# Patient Record
Sex: MALE | Race: WHITE | URBAN - NONMETROPOLITAN AREA
[De-identification: names, ages, dates, MRNs, and addresses within clinical notes are randomized per-mention and may not be internally consistent; named-entity substitution may affect disease eponyms.]

---

## 2018-02-08 ENCOUNTER — OFFICE VISIT (OUTPATIENT)
Dept: FAMILY MEDICINE CLINIC | Age: 38
End: 2018-02-08

## 2018-02-08 VITALS
HEART RATE: 74 BPM | RESPIRATION RATE: 16 BRPM | HEIGHT: 63 IN | OXYGEN SATURATION: 98 % | WEIGHT: 155 LBS | BODY MASS INDEX: 27.46 KG/M2 | DIASTOLIC BLOOD PRESSURE: 78 MMHG | SYSTOLIC BLOOD PRESSURE: 110 MMHG

## 2018-02-08 DIAGNOSIS — G89.29 CHRONIC MIDLINE LOW BACK PAIN WITHOUT SCIATICA: ICD-10-CM

## 2018-02-08 DIAGNOSIS — R25.2 SPASM: ICD-10-CM

## 2018-02-08 DIAGNOSIS — Z00.00 ROUTINE HEALTH MAINTENANCE: ICD-10-CM

## 2018-02-08 DIAGNOSIS — M54.50 CHRONIC MIDLINE LOW BACK PAIN WITHOUT SCIATICA: ICD-10-CM

## 2018-02-08 DIAGNOSIS — K21.9 GASTROESOPHAGEAL REFLUX DISEASE WITHOUT ESOPHAGITIS: ICD-10-CM

## 2018-02-08 DIAGNOSIS — R19.7 DIARRHEA, UNSPECIFIED TYPE: Primary | ICD-10-CM

## 2018-02-08 PROCEDURE — 99203 OFFICE O/P NEW LOW 30 MIN: CPT | Performed by: NURSE PRACTITIONER

## 2018-02-08 RX ORDER — DICYCLOMINE HYDROCHLORIDE 10 MG/1
10 CAPSULE ORAL
Qty: 120 CAPSULE | Refills: 1 | Status: SHIPPED | OUTPATIENT
Start: 2018-02-08

## 2018-02-08 RX ORDER — RANITIDINE 150 MG/1
150 TABLET ORAL NIGHTLY
Qty: 30 TABLET | Refills: 3 | Status: SHIPPED | OUTPATIENT
Start: 2018-02-08

## 2018-02-08 RX ORDER — TIZANIDINE 4 MG/1
4 TABLET ORAL EVERY 8 HOURS PRN
Qty: 30 TABLET | Refills: 0 | Status: SHIPPED | OUTPATIENT
Start: 2018-02-08

## 2018-02-08 ASSESSMENT — PATIENT HEALTH QUESTIONNAIRE - PHQ9
SUM OF ALL RESPONSES TO PHQ QUESTIONS 1-9: 0
2. FEELING DOWN, DEPRESSED OR HOPELESS: 0
SUM OF ALL RESPONSES TO PHQ9 QUESTIONS 1 & 2: 0
1. LITTLE INTEREST OR PLEASURE IN DOING THINGS: 0

## 2018-02-08 ASSESSMENT — ENCOUNTER SYMPTOMS
BLOOD IN STOOL: 0
COUGH: 0
CHEST TIGHTNESS: 0
BACK PAIN: 1
NAUSEA: 0
ABDOMINAL PAIN: 0
DIARRHEA: 1
SHORTNESS OF BREATH: 0
WHEEZING: 0
VOMITING: 0

## 2018-02-08 NOTE — PROGRESS NOTES
Smoker    Smokeless Tobacco Use  Current User Smokeless Tobacco Type  Snuff          Alcohol History     Alcohol Use Status  No          Drug Use     Drug Use Status  No          Sexual Activity     Sexually Active  Yes Partners  Female                Problem List Items Addressed This Visit     Diarrhea - Primary    Relevant Medications    dicyclomine (BENTYL) 10 MG capsule    Other Relevant Orders    [facility]- Daniel Machado MD    Chronic midline low back pain without sciatica    Relevant Medications    tiZANidine (ZANAFLEX) 4 MG tablet      Other Visit Diagnoses     Routine health maintenance        Relevant Orders    CBC    Comprehensive Metabolic Panel    HIV Screen    Lipid Panel    TSH with Reflex    Gastroesophageal reflux disease without esophagitis        Relevant Medications    dicyclomine (BENTYL) 10 MG capsule    ranitidine (ZANTAC) 150 MG tablet    Spasm        Relevant Medications    tiZANidine (ZANAFLEX) 4 MG tablet          Review of Systems   Constitutional: Negative for appetite change, chills, fatigue and unexpected weight change. Respiratory: Negative for cough, chest tightness, shortness of breath and wheezing. Cardiovascular: Negative for chest pain, palpitations and leg swelling. Gastrointestinal: Positive for diarrhea. Negative for abdominal pain, blood in stool, nausea and vomiting. Musculoskeletal: Positive for back pain. Negative for arthralgias. Neurological: Negative for weakness, numbness and headaches. Hematological: Negative for adenopathy. OBJECTIVE:    /78 (Site: Left Arm, Position: Sitting, Cuff Size: Medium Adult)   Pulse 74   Resp 16   Ht 5' 3\" (1.6 m)   Wt 155 lb (70.3 kg)   SpO2 98%   BMI 27.46 kg/m²     Physical Exam   Constitutional: He is oriented to person, place, and time. He appears well-developed and well-nourished. HENT:   Head: Normocephalic.    Right Ear: Hearing, tympanic membrane, external ear and ear canal normal.   Left Ear: Hearing, tympanic membrane, external ear and ear canal normal.   Nose: Nose normal.   Mouth/Throat: Uvula is midline, oropharynx is clear and moist and mucous membranes are normal.   Eyes: Conjunctivae and EOM are normal. Pupils are equal, round, and reactive to light. Neck: Normal range of motion. Neck supple. Cardiovascular: Normal rate, regular rhythm and normal heart sounds. Exam reveals no gallop and no friction rub. No murmur heard. Pulmonary/Chest: Effort normal and breath sounds normal. No respiratory distress. He has no wheezes. He has no rhonchi. He has no rales. Abdominal: Soft. Bowel sounds are normal. He exhibits no distension. There is no tenderness. There is no rigidity and no guarding. Musculoskeletal: Normal range of motion. He exhibits no edema. Lumbar back: He exhibits normal range of motion, no tenderness, no bony tenderness, no swelling and no deformity. Negative SLR   Lymphadenopathy:     He has no cervical adenopathy. Right cervical: No superficial cervical and no posterior cervical adenopathy present. Left cervical: No superficial cervical and no posterior cervical adenopathy present. Neurological: He is alert and oriented to person, place, and time. No cranial nerve deficit. Skin: Skin is warm and dry. Psychiatric: He has a normal mood and affect. His behavior is normal.       ASSESSMENT/PLAN:    1. Diarrhea, unspecified type  Trial on bentyl  Referred to GI for further evaluation  - [facility]- Daniel Machado MD  - dicyclomine (BENTYL) 10 MG capsule; Take 1 capsule by mouth 4 times daily (before meals and nightly)  Dispense: 120 capsule; Refill: 1    2. Chronic midline low back pain without sciatica  zanaflex PRN spasm  Strongly encouraged yoga, regular exercise  - tiZANidine (ZANAFLEX) 4 MG tablet; Take 1 tablet by mouth every 8 hours as needed (spasm)  Dispense: 30 tablet; Refill: 0    3.  Routine health

## 2018-03-21 ENCOUNTER — OFFICE VISIT (OUTPATIENT)
Dept: INTERNAL MEDICINE CLINIC | Age: 38
End: 2018-03-21

## 2018-03-21 VITALS
HEIGHT: 63 IN | RESPIRATION RATE: 14 BRPM | SYSTOLIC BLOOD PRESSURE: 122 MMHG | WEIGHT: 154 LBS | BODY MASS INDEX: 27.29 KG/M2 | OXYGEN SATURATION: 98 % | HEART RATE: 75 BPM | DIASTOLIC BLOOD PRESSURE: 80 MMHG

## 2018-03-21 DIAGNOSIS — N45.1 EPIDIDYMITIS: ICD-10-CM

## 2018-03-21 DIAGNOSIS — M54.6 CHRONIC BILATERAL THORACIC BACK PAIN: Primary | ICD-10-CM

## 2018-03-21 DIAGNOSIS — J30.9 CHRONIC ALLERGIC RHINITIS, UNSPECIFIED SEASONALITY, UNSPECIFIED TRIGGER: ICD-10-CM

## 2018-03-21 DIAGNOSIS — Z00.00 HEALTH CARE MAINTENANCE: ICD-10-CM

## 2018-03-21 DIAGNOSIS — G89.29 CHRONIC MIDLINE LOW BACK PAIN WITHOUT SCIATICA: ICD-10-CM

## 2018-03-21 DIAGNOSIS — M54.50 CHRONIC MIDLINE LOW BACK PAIN WITHOUT SCIATICA: ICD-10-CM

## 2018-03-21 DIAGNOSIS — G89.29 CHRONIC BILATERAL THORACIC BACK PAIN: Primary | ICD-10-CM

## 2018-03-21 PROCEDURE — 99214 OFFICE O/P EST MOD 30 MIN: CPT | Performed by: NURSE PRACTITIONER

## 2018-03-21 RX ORDER — LORATADINE 10 MG/1
10 TABLET ORAL DAILY
Qty: 30 TABLET | Refills: 3 | Status: SHIPPED | OUTPATIENT
Start: 2018-03-21

## 2018-03-26 ENCOUNTER — NURSE ONLY (OUTPATIENT)
Dept: INTERNAL MEDICINE CLINIC | Age: 38
End: 2018-03-26

## 2018-03-26 DIAGNOSIS — Z00.00 ROUTINE HEALTH MAINTENANCE: ICD-10-CM

## 2018-03-26 LAB
A/G RATIO: 2.5 (ref 1.1–2.2)
ALBUMIN SERPL-MCNC: 5.2 G/DL (ref 3.4–5)
ALP BLD-CCNC: 64 U/L (ref 40–129)
ALT SERPL-CCNC: 36 U/L (ref 10–40)
AMPHETAMINE SCREEN, URINE: NORMAL
ANION GAP SERPL CALCULATED.3IONS-SCNC: 15 MMOL/L (ref 3–16)
AST SERPL-CCNC: 23 U/L (ref 15–37)
BARBITURATE SCREEN URINE: NORMAL
BASOPHILS ABSOLUTE: 0 K/UL (ref 0–0.2)
BASOPHILS RELATIVE PERCENT: 0.8 %
BENZODIAZEPINE SCREEN, URINE: NORMAL
BILIRUB SERPL-MCNC: 0.7 MG/DL (ref 0–1)
BUN BLDV-MCNC: 9 MG/DL (ref 7–20)
CALCIUM SERPL-MCNC: 9.4 MG/DL (ref 8.3–10.6)
CANNABINOID SCREEN URINE: NORMAL
CHLORIDE BLD-SCNC: 101 MMOL/L (ref 99–110)
CO2: 28 MMOL/L (ref 21–32)
COCAINE METABOLITE SCREEN URINE: NORMAL
CREAT SERPL-MCNC: 0.9 MG/DL (ref 0.9–1.3)
EOSINOPHILS ABSOLUTE: 0.1 K/UL (ref 0–0.6)
EOSINOPHILS RELATIVE PERCENT: 1.5 %
GFR AFRICAN AMERICAN: >60
GFR NON-AFRICAN AMERICAN: >60
GLOBULIN: 2.1 G/DL
GLUCOSE BLD-MCNC: 96 MG/DL (ref 70–99)
HCT VFR BLD CALC: 45.5 % (ref 40.5–52.5)
HEMOGLOBIN: 15.7 G/DL (ref 13.5–17.5)
LYMPHOCYTES ABSOLUTE: 1.3 K/UL (ref 1–5.1)
LYMPHOCYTES RELATIVE PERCENT: 22.6 %
Lab: NORMAL
MCH RBC QN AUTO: 30.1 PG (ref 26–34)
MCHC RBC AUTO-ENTMCNC: 34.5 G/DL (ref 31–36)
MCV RBC AUTO: 87.3 FL (ref 80–100)
METHADONE SCREEN, URINE: NORMAL
MONOCYTES ABSOLUTE: 0.6 K/UL (ref 0–1.3)
MONOCYTES RELATIVE PERCENT: 9.5 %
NEUTROPHILS ABSOLUTE: 3.8 K/UL (ref 1.7–7.7)
NEUTROPHILS RELATIVE PERCENT: 65.6 %
OPIATE SCREEN URINE: NORMAL
OXYCODONE URINE: NORMAL
PDW BLD-RTO: 12.7 % (ref 12.4–15.4)
PH UA: 5
PHENCYCLIDINE SCREEN URINE: NORMAL
PLATELET # BLD: 247 K/UL (ref 135–450)
PMV BLD AUTO: 8.8 FL (ref 5–10.5)
POTASSIUM SERPL-SCNC: 4 MMOL/L (ref 3.5–5.1)
PROPOXYPHENE SCREEN: NORMAL
RBC # BLD: 5.21 M/UL (ref 4.2–5.9)
SODIUM BLD-SCNC: 144 MMOL/L (ref 136–145)
T4 FREE: 1.3 NG/DL (ref 0.9–1.8)
TOTAL PROTEIN: 7.3 G/DL (ref 6.4–8.2)
TSH SERPL DL<=0.05 MIU/L-ACNC: 1.82 UIU/ML (ref 0.27–4.2)
WBC # BLD: 5.8 K/UL (ref 4–11)

## 2018-03-29 ENCOUNTER — TELEPHONE (OUTPATIENT)
Dept: INTERNAL MEDICINE CLINIC | Age: 38
End: 2018-03-29

## 2018-04-05 ENCOUNTER — TELEPHONE (OUTPATIENT)
Dept: INTERNAL MEDICINE CLINIC | Age: 38
End: 2018-04-05

## 2018-04-12 ENCOUNTER — TELEPHONE (OUTPATIENT)
Dept: INTERNAL MEDICINE CLINIC | Age: 38
End: 2018-04-12

## 2018-04-20 ENCOUNTER — OFFICE VISIT (OUTPATIENT)
Dept: INTERNAL MEDICINE CLINIC | Age: 38
End: 2018-04-20

## 2018-04-20 VITALS
WEIGHT: 150 LBS | HEART RATE: 94 BPM | SYSTOLIC BLOOD PRESSURE: 115 MMHG | RESPIRATION RATE: 16 BRPM | BODY MASS INDEX: 26.58 KG/M2 | DIASTOLIC BLOOD PRESSURE: 80 MMHG | OXYGEN SATURATION: 98 % | TEMPERATURE: 98.5 F | HEIGHT: 63 IN

## 2018-04-20 DIAGNOSIS — R10.13 EPIGASTRIC PAIN: ICD-10-CM

## 2018-04-20 DIAGNOSIS — G89.29 CHRONIC BILATERAL THORACIC BACK PAIN: ICD-10-CM

## 2018-04-20 DIAGNOSIS — M54.50 CHRONIC MIDLINE LOW BACK PAIN WITHOUT SCIATICA: ICD-10-CM

## 2018-04-20 DIAGNOSIS — R19.8 ABNORMAL FINDINGS ON ESOPHAGOGASTRODUODENOSCOPY (EGD): Primary | ICD-10-CM

## 2018-04-20 DIAGNOSIS — J30.9 CHRONIC ALLERGIC RHINITIS, UNSPECIFIED SEASONALITY, UNSPECIFIED TRIGGER: ICD-10-CM

## 2018-04-20 DIAGNOSIS — G89.29 CHRONIC MIDLINE LOW BACK PAIN WITHOUT SCIATICA: ICD-10-CM

## 2018-04-20 DIAGNOSIS — M54.6 CHRONIC BILATERAL THORACIC BACK PAIN: ICD-10-CM

## 2018-04-20 PROCEDURE — 99213 OFFICE O/P EST LOW 20 MIN: CPT | Performed by: NURSE PRACTITIONER

## 2018-04-24 PROBLEM — R19.8 ABNORMAL FINDINGS ON ESOPHAGOGASTRODUODENOSCOPY (EGD): Status: ACTIVE | Noted: 2018-04-24

## 2018-04-24 PROBLEM — R10.13 EPIGASTRIC PAIN: Status: ACTIVE | Noted: 2018-04-24
